# Patient Record
Sex: MALE | Race: WHITE | Employment: UNEMPLOYED | URBAN - METROPOLITAN AREA
[De-identification: names, ages, dates, MRNs, and addresses within clinical notes are randomized per-mention and may not be internally consistent; named-entity substitution may affect disease eponyms.]

---

## 2021-06-22 ENCOUNTER — HOSPITAL ENCOUNTER (OUTPATIENT)
Age: 61
Setting detail: SPECIMEN
Discharge: HOME | End: 2021-06-22
Payer: COMMERCIAL

## 2021-06-22 LAB
AMPHETAMINE SCREEN URINE: NEGATIVE
BARBITURATE SCREEN URINE: NEGATIVE
BENZODIAZEPINE SCREEN, URINE: NEGATIVE
BUPRENORPHINE URINE: ABNORMAL
CANNABINOID SCREEN URINE: NEGATIVE
COCAINE METABOLITE, URINE: POSITIVE
MDMA URINE: ABNORMAL
METHADONE SCREEN, URINE: NEGATIVE
METHAMPHETAMINE, URINE: ABNORMAL
OPIATES, URINE: NEGATIVE
OXYCODONE SCREEN URINE: NEGATIVE
PHENCYCLIDINE, URINE: NEGATIVE
PROPOXYPHENE, URINE: ABNORMAL
TEST INFORMATION: ABNORMAL
TRICYCLIC ANTIDEPRESSANTS, UR: ABNORMAL

## 2021-06-25 LAB — GABAPENTIN QNT URINE: <5 UG/ML

## 2021-08-24 ENCOUNTER — HOSPITAL ENCOUNTER (OUTPATIENT)
Age: 61
Setting detail: SPECIMEN
Discharge: HOME | End: 2021-08-24
Payer: COMMERCIAL

## 2021-08-26 LAB
ALCOHOL URINE: NEGATIVE MG/DL
AMPHETAMINE SCREEN URINE: NEGATIVE NG/ML
BARBITURATE SCREEN URINE: NEGATIVE NG/ML
BENZODIAZEPINE SCREEN, URINE: NEGATIVE NG/ML
CANNABINOID SCREEN URINE: NEGATIVE NG/ML
COCAINE(METAB.)SCREEN, URINE: POSITIVE NG/ML
CREATININE URINE: 53.1 MG/DL (ref 20–400)
Lab: NORMAL
METHADONE SCREEN, URINE: NEGATIVE NG/ML
OPIATES, URINE: NEGATIVE NG/ML
PHENCYCLIDINE, URINE: NEGATIVE NG/ML
PROPOXYPHENE, URINE: NEGATIVE NG/ML

## 2021-08-29 LAB
COCAINE, CONFIRM, URINE: 810 NG/ML
GABAPENTIN QNT URINE: <5 UG/ML

## 2023-08-30 ENCOUNTER — APPOINTMENT (OUTPATIENT)
Dept: GENERAL RADIOLOGY | Age: 63
End: 2023-08-30
Payer: COMMERCIAL

## 2023-08-30 ENCOUNTER — HOSPITAL ENCOUNTER (EMERGENCY)
Age: 63
Discharge: HOME | End: 2023-08-30
Attending: EMERGENCY MEDICINE
Payer: COMMERCIAL

## 2023-08-30 VITALS
HEART RATE: 74 BPM | RESPIRATION RATE: 18 BRPM | WEIGHT: 268.96 LBS | OXYGEN SATURATION: 99 % | TEMPERATURE: 97.1 F | DIASTOLIC BLOOD PRESSURE: 90 MMHG | HEIGHT: 74 IN | BODY MASS INDEX: 34.52 KG/M2 | SYSTOLIC BLOOD PRESSURE: 134 MMHG

## 2023-08-30 DIAGNOSIS — M79.672 LEFT FOOT PAIN: Primary | ICD-10-CM

## 2023-08-30 LAB
BASOPHILS # BLD: 0.06 K/UL (ref 0–0.2)
BASOPHILS NFR BLD: 1 % (ref 0–2)
CRP SERPL HS-MCNC: 3.2 MG/L (ref 0–5)
EOSINOPHIL # BLD: 0.54 K/UL (ref 0–0.44)
EOSINOPHILS RELATIVE PERCENT: 4 % (ref 1–4)
ERYTHROCYTE [DISTWIDTH] IN BLOOD BY AUTOMATED COUNT: 12.3 % (ref 11.8–14.4)
ERYTHROCYTE [SEDIMENTATION RATE] IN BLOOD BY PHOTOMETRIC METHOD: 17 MM/HR (ref 0–20)
GLUCOSE BLD-MCNC: 161 MG/DL (ref 75–110)
HCT VFR BLD AUTO: 45.2 % (ref 40.7–50.3)
HGB BLD-MCNC: 14.4 G/DL (ref 13–17)
IMM GRANULOCYTES # BLD AUTO: 0.06 K/UL (ref 0–0.3)
IMM GRANULOCYTES NFR BLD: 1 %
LYMPHOCYTES NFR BLD: 2.95 K/UL (ref 1.1–3.7)
LYMPHOCYTES RELATIVE PERCENT: 23 % (ref 24–43)
MCH RBC QN AUTO: 29.1 PG (ref 25.2–33.5)
MCHC RBC AUTO-ENTMCNC: 31.9 G/DL (ref 28.4–34.8)
MCV RBC AUTO: 91.3 FL (ref 82.6–102.9)
MONOCYTES NFR BLD: 0.9 K/UL (ref 0.1–1.2)
MONOCYTES NFR BLD: 7 % (ref 3–12)
NEUTROPHILS NFR BLD: 64 % (ref 36–65)
NEUTS SEG NFR BLD: 8.17 K/UL (ref 1.5–8.1)
NRBC BLD-RTO: 0 PER 100 WBC
PLATELET # BLD AUTO: 351 K/UL (ref 138–453)
PMV BLD AUTO: 11.9 FL (ref 8.1–13.5)
RBC # BLD AUTO: 4.95 M/UL (ref 4.21–5.77)
WBC OTHER # BLD: 12.7 K/UL (ref 3.5–11.3)

## 2023-08-30 PROCEDURE — 82947 ASSAY GLUCOSE BLOOD QUANT: CPT

## 2023-08-30 PROCEDURE — 86140 C-REACTIVE PROTEIN: CPT

## 2023-08-30 PROCEDURE — 99284 EMERGENCY DEPT VISIT MOD MDM: CPT

## 2023-08-30 PROCEDURE — 85025 COMPLETE CBC W/AUTO DIFF WBC: CPT

## 2023-08-30 PROCEDURE — 85652 RBC SED RATE AUTOMATED: CPT

## 2023-08-30 PROCEDURE — 73620 X-RAY EXAM OF FOOT: CPT

## 2023-08-30 RX ORDER — DOXYCYCLINE HYCLATE 100 MG
100 TABLET ORAL 2 TIMES DAILY
Qty: 14 TABLET | Refills: 0 | Status: SHIPPED | OUTPATIENT
Start: 2023-08-30 | End: 2023-08-30 | Stop reason: SDUPTHER

## 2023-08-30 RX ORDER — DOXYCYCLINE HYCLATE 100 MG
100 TABLET ORAL 2 TIMES DAILY
Qty: 14 TABLET | Refills: 0 | Status: SHIPPED | OUTPATIENT
Start: 2023-08-30 | End: 2023-09-06

## 2023-08-30 ASSESSMENT — PAIN SCALES - GENERAL: PAINLEVEL_OUTOF10: 10

## 2023-08-30 ASSESSMENT — ENCOUNTER SYMPTOMS
DIARRHEA: 0
RESPIRATORY NEGATIVE: 1
VOMITING: 0
SHORTNESS OF BREATH: 0
NAUSEA: 0
EYES NEGATIVE: 1
COLOR CHANGE: 0

## 2023-08-30 ASSESSMENT — PAIN - FUNCTIONAL ASSESSMENT
PAIN_FUNCTIONAL_ASSESSMENT: 0-10
PAIN_FUNCTIONAL_ASSESSMENT: NONE - DENIES PAIN
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
## 2023-08-30 NOTE — ED TRIAGE NOTES
Pt presents to the ED with c/o toe pain. Pt states been having toe pain and \"peeling off toe\" for past two weeks. Pt states cannot feel feet pr toes when touched but has constant throbbing pain on toe. Pt has hx of diabetes and states not compliant with a diabetic diet. Pt glucose 161 upon arrival.  Pt has hx of HTN, blood clots, and neuropathy. VSS. Call light within reach. ED staff at bedside.

## 2023-08-30 NOTE — ED NOTES
Pt requested IV in right arm be removed due to pain at site      [facility] EFRAIN, HEATH  08/30/23 1832

## 2023-08-30 NOTE — DISCHARGE INSTRUCTIONS
Podiatry:  -Please make an attended follow-up visit with Dr. Eber Corey outpatient 1 week after discharge  -Please continue performing daily dressing changes and evaluations to left lower extremity wounds consisting of the following:   -Foot: Silver cell and Band-Aid  -Please keep your lower extremity wounds dry clean and covered at all times until follow-up visit  -Please take all prescribed medications as instructed  -Please restart all home medications as prescribed  -Please return to the hospital if any of the following local signs of infection arise: Increased/streaking redness, pain, swelling, drainage or malodor  -Please return to the hospital for any the following systemic signs of infection arise: Nausea, vomiting, fever, chills, diarrhea, shortness of breath or chest pain

## 2023-10-24 ENCOUNTER — HOSPITAL ENCOUNTER (EMERGENCY)
Age: 63
Discharge: HOME | End: 2023-10-24
Attending: EMERGENCY MEDICINE
Payer: COMMERCIAL

## 2023-10-24 ENCOUNTER — APPOINTMENT (OUTPATIENT)
Dept: GENERAL RADIOLOGY | Age: 63
End: 2023-10-24
Payer: COMMERCIAL

## 2023-10-24 VITALS
WEIGHT: 263 LBS | SYSTOLIC BLOOD PRESSURE: 137 MMHG | DIASTOLIC BLOOD PRESSURE: 74 MMHG | OXYGEN SATURATION: 97 % | RESPIRATION RATE: 18 BRPM | TEMPERATURE: 98.3 F | HEART RATE: 90 BPM | BODY MASS INDEX: 33.75 KG/M2 | HEIGHT: 74 IN

## 2023-10-24 DIAGNOSIS — T14.90XA CLOSED WOUND: Primary | ICD-10-CM

## 2023-10-24 LAB
ALBUMIN SERPL-MCNC: 3.7 G/DL (ref 3.5–5.2)
ALP SERPL-CCNC: 89 U/L (ref 40–129)
ALT SERPL-CCNC: 14 U/L (ref 5–41)
ANION GAP SERPL CALCULATED.3IONS-SCNC: 8 MMOL/L (ref 9–17)
AST SERPL-CCNC: 16 U/L
BASOPHILS # BLD: 0.06 K/UL (ref 0–0.2)
BASOPHILS NFR BLD: 1 % (ref 0–2)
BILIRUB SERPL-MCNC: 0.2 MG/DL (ref 0.3–1.2)
BUN SERPL-MCNC: 29 MG/DL (ref 8–23)
BUN/CREAT SERPL: 32 (ref 9–20)
CALCIUM SERPL-MCNC: 8.6 MG/DL (ref 8.6–10.4)
CHLORIDE SERPL-SCNC: 103 MMOL/L (ref 98–107)
CO2 SERPL-SCNC: 22 MMOL/L (ref 20–31)
CREAT SERPL-MCNC: 0.9 MG/DL (ref 0.7–1.2)
CRP SERPL HS-MCNC: <3 MG/L (ref 0–5)
EOSINOPHIL # BLD: 0.46 K/UL (ref 0–0.44)
EOSINOPHILS RELATIVE PERCENT: 4 % (ref 1–4)
ERYTHROCYTE [DISTWIDTH] IN BLOOD BY AUTOMATED COUNT: 12.5 % (ref 11.8–14.4)
ERYTHROCYTE [SEDIMENTATION RATE] IN BLOOD BY PHOTOMETRIC METHOD: 36 MM/HR (ref 0–20)
GFR SERPL CREATININE-BSD FRML MDRD: >60 ML/MIN/1.73M2
GLUCOSE SERPL-MCNC: 131 MG/DL (ref 70–99)
HCT VFR BLD AUTO: 42.9 % (ref 40.7–50.3)
HGB BLD-MCNC: 13.9 G/DL (ref 13–17)
IMM GRANULOCYTES # BLD AUTO: 0.03 K/UL (ref 0–0.3)
IMM GRANULOCYTES NFR BLD: 0 %
LYMPHOCYTES NFR BLD: 2.59 K/UL (ref 1.1–3.7)
LYMPHOCYTES RELATIVE PERCENT: 20 % (ref 24–43)
MCH RBC QN AUTO: 29.1 PG (ref 25.2–33.5)
MCHC RBC AUTO-ENTMCNC: 32.4 G/DL (ref 28.4–34.8)
MCV RBC AUTO: 89.9 FL (ref 82.6–102.9)
MONOCYTES NFR BLD: 1.14 K/UL (ref 0.1–1.2)
MONOCYTES NFR BLD: 9 % (ref 3–12)
NEUTROPHILS NFR BLD: 66 % (ref 36–65)
NEUTS SEG NFR BLD: 8.55 K/UL (ref 1.5–8.1)
NRBC BLD-RTO: 0 PER 100 WBC
PLATELET # BLD AUTO: 368 K/UL (ref 138–453)
PMV BLD AUTO: 10.9 FL (ref 8.1–13.5)
POTASSIUM SERPL-SCNC: 4.8 MMOL/L (ref 3.7–5.3)
PROT SERPL-MCNC: 6.9 G/DL (ref 6.4–8.3)
RBC # BLD AUTO: 4.77 M/UL (ref 4.21–5.77)
SODIUM SERPL-SCNC: 133 MMOL/L (ref 135–144)
WBC OTHER # BLD: 12.8 K/UL (ref 3.5–11.3)

## 2023-10-24 PROCEDURE — 99284 EMERGENCY DEPT VISIT MOD MDM: CPT

## 2023-10-24 PROCEDURE — 73630 X-RAY EXAM OF FOOT: CPT

## 2023-10-24 PROCEDURE — 80053 COMPREHEN METABOLIC PANEL: CPT

## 2023-10-24 PROCEDURE — 85025 COMPLETE CBC W/AUTO DIFF WBC: CPT

## 2023-10-24 PROCEDURE — 86140 C-REACTIVE PROTEIN: CPT

## 2023-10-24 PROCEDURE — 85652 RBC SED RATE AUTOMATED: CPT

## 2023-10-24 RX ORDER — HYDROCODONE BITARTRATE AND ACETAMINOPHEN 5; 325 MG/1; MG/1
1 TABLET ORAL EVERY 8 HOURS PRN
Qty: 6 TABLET | Refills: 0 | Status: SHIPPED | OUTPATIENT
Start: 2023-10-24 | End: 2023-10-27

## 2023-10-24 RX ORDER — LIDOCAINE HYDROCHLORIDE 10 MG/ML
10 INJECTION, SOLUTION INFILTRATION; PERINEURAL ONCE
Status: DISCONTINUED | OUTPATIENT
Start: 2023-10-24 | End: 2023-10-24 | Stop reason: HOSPADM

## 2023-10-24 RX ORDER — CEFDINIR 300 MG/1
300 CAPSULE ORAL 2 TIMES DAILY
Qty: 20 CAPSULE | Refills: 0 | Status: SHIPPED | OUTPATIENT
Start: 2023-10-24 | End: 2023-11-03

## 2023-10-24 ASSESSMENT — ENCOUNTER SYMPTOMS
EYE REDNESS: 0
ABDOMINAL PAIN: 0
SHORTNESS OF BREATH: 0
EYE PAIN: 0
BACK PAIN: 0
VOMITING: 0
COUGH: 0
DIARRHEA: 0
SORE THROAT: 0

## 2023-10-24 NOTE — DISCHARGE INSTRUCTIONS
Podiatry ED Instructions:      Medications: Take your prescriptions as directed  You are receiving new prescriptions for pain medication for Omnicef oral antibiotic, take as directed  If your pain is not severe then you may take the non-prescription medication that you normally take for aches and pains ie Tylenol and Ibuprofen (alternating), or if severe pain occurs these will serve as additional medication in conjuction with the pain medication from ED    Ambulation and Activity:  You are advised to go directly home from the hospital  Use crutches or scooter   We recommend knee scooter if possible, you can also obtain these on [address] [place] [address] for rather affordable and quickly obtainable. You may walk on left foot in normal shoe gear  Avoid stairs. Do not lift or move heavy objects      Bandage and Wound Care Instructions:  Keep bandage clean and dry  Change dressing daily with Betadine and gauze and Coban tape  please use shower cover around leg if you do shower so that dressing does not get wet  Do not shower or bathe the operative extremity  Do not remove the bandage (unless it is too tight you may remove the outer bandage (ACE) )  Do not attempt to put anything between the cast or dressing and your skin, some itching is normal.    Ice and Elevation:   Elevate extremity as much as possible to reduce swelling and discomfort. Elevate with 2 pillows at or above the level of the heart for the first 72 hours. This is important for swelling and pain  Ice:  [address] dispensed insulated ice bag over the bandage 20 minutes of every hour while awake for the first 72 hours. You may ice behind the knee as well. Special Instructions: Call your doctor immediately if you develop any of the following. Fever over 100.4 degrees Fahrenheit by mouth - take your temperature daily until your first follow up visit.   Pain not relieved by medication ordered  Swelling, increased redness, warmth, or hardness around operative
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
## 2023-10-25 ASSESSMENT — ENCOUNTER SYMPTOMS
ALLERGIC/IMMUNOLOGIC NEGATIVE: 1
COLOR CHANGE: 1
GASTROINTESTINAL NEGATIVE: 1
RESPIRATORY NEGATIVE: 1
EYES NEGATIVE: 1